# Patient Record
Sex: FEMALE | Race: WHITE | URBAN - METROPOLITAN AREA
[De-identification: names, ages, dates, MRNs, and addresses within clinical notes are randomized per-mention and may not be internally consistent; named-entity substitution may affect disease eponyms.]

---

## 2020-02-20 NOTE — PATIENT DISCUSSION
Medical Necessity Information: It is in your best interest to select a reason for this procedure from the list below. All of these items fulfill various CMS LCD requirements except the new and changing color options. Plan OS 1st ; TMF+3.25/CV/B+/B Goal LANDY + OCUCOAT. + SDPO. Render Post-Care Instructions In Note?: no Number Of Freeze-Thaw Cycles: 1 freeze-thaw cycle Detail Level: Zone Medical Necessity Clause: This procedure was medically necessary because the lesions that were treated were: Post-Care Instructions: I reviewed with the patient in detail post-care instructions. Patient is to wear sunprotection, and avoid picking at any of the treated lesions. Pt may apply Vaseline to crusted or scabbing areas. Consent: The patient's consent was obtained including but not limited to risks of crusting, scabbing, blistering, scarring, darker or lighter pigmentary change, recurrence, incomplete removal and infection.

## 2021-05-20 ENCOUNTER — IMPORTED ENCOUNTER (OUTPATIENT)
Dept: URBAN - METROPOLITAN AREA CLINIC 1 | Facility: CLINIC | Age: 86
End: 2021-05-20

## 2021-05-20 PROBLEM — R73.09: Noted: 2021-05-20

## 2021-05-20 PROBLEM — H25.813: Noted: 2021-05-20

## 2021-05-20 PROBLEM — H04.123: Noted: 2021-05-20

## 2021-05-20 PROCEDURE — 99204 OFFICE O/P NEW MOD 45 MIN: CPT

## 2021-05-20 NOTE — PATIENT DISCUSSION
1.  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 Stressed the importance of strong glucose control. Advised of importance of at least yearly dilated examinations but to contact us immediately for any problems or concerns. 2. Cataract OU --  Visually Significant OU discussed the risks benefits alternatives and limitations of cataract surgery. The patient stated a full understanding and a desire to proceed with the procedure. The patient will need to return for preop appointment with cataract measurements and to have any additional questions answered and start pre-operative eye drops as directed. Phaco PCL OD then OS 3. Dry Eyes OU -- Recommend the use of ATs BID OU routinely. All conditions discussed today with patinet and patients daughter. Letter to PCP. Return for an appointment in [facility] with Dr. Jaya Mata.

## 2021-08-27 ENCOUNTER — IMPORTED ENCOUNTER (OUTPATIENT)
Dept: URBAN - METROPOLITAN AREA CLINIC 1 | Facility: CLINIC | Age: 86
End: 2021-08-27

## 2021-08-27 PROBLEM — R73.09: Noted: 2021-08-27

## 2021-08-27 PROBLEM — Z79.4: Noted: 2021-08-27

## 2021-08-27 PROBLEM — H25.813: Noted: 2021-08-27

## 2021-08-27 PROBLEM — E11.9: Noted: 2021-08-27

## 2021-08-27 PROCEDURE — 92136 OPHTHALMIC BIOMETRY: CPT

## 2021-08-27 PROCEDURE — 92015 DETERMINE REFRACTIVE STATE: CPT

## 2021-08-27 NOTE — PATIENT DISCUSSION
1.  Cataract OU --  Visually Significant discussed the risks benefits alternatives and limitations of cataract surgery. The patient stated a full understanding and a desire to proceed with the procedure. Discussed with patient if PO Gtts are more than $120 for all three combined when filling at their Pharmacy please call our office to request generic substitutions. Pt came to pre-op appointment today with daughter (POA). Pt understands they will need glasses post-op to achieve their best corrected vision. and Lifestyle Questionnaire Completed. Phaco PCL OD then OSPhaco PCL OD 2. 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 Stressed the importance of strong glucose control. Advised of importance of at least yearly dilated examinations but to contact us immediately for any problems or concerns. 3. Dry Eyes OU -- Recommend the use of ATs BID OU routinely. All conditions discussed today with carlosnet and patients daughter.

## 2021-09-08 ENCOUNTER — IMPORTED ENCOUNTER (OUTPATIENT)
Dept: URBAN - METROPOLITAN AREA CLINIC 1 | Facility: CLINIC | Age: 86
End: 2021-09-08

## 2021-09-09 ENCOUNTER — IMPORTED ENCOUNTER (OUTPATIENT)
Dept: URBAN - METROPOLITAN AREA CLINIC 1 | Facility: CLINIC | Age: 86
End: 2021-09-09

## 2021-09-09 PROBLEM — Z96.1: Noted: 2021-09-09

## 2021-09-09 PROCEDURE — 99024 POSTOP FOLLOW-UP VISIT: CPT

## 2021-09-09 NOTE — PATIENT DISCUSSION
POD#1 CE/IOL OD (Standard) doing well. Corneal Edema noted on today's exam 1 gtt Combigan instilled prior to leavingUse Zylet BID OD Prolensa Qdaily OD : Use all gtts through completion of PO gtt chart regimen/ Per our instructions given to patient.   Post op Warnings Reiterated RTC as scheduled

## 2021-09-24 ENCOUNTER — IMPORTED ENCOUNTER (OUTPATIENT)
Dept: URBAN - METROPOLITAN AREA CLINIC 1 | Facility: CLINIC | Age: 86
End: 2021-09-24

## 2021-09-24 PROBLEM — H25.812: Noted: 2021-09-24

## 2021-09-24 PROCEDURE — 92136 OPHTHALMIC BIOMETRY: CPT

## 2021-09-24 NOTE — PATIENT DISCUSSION
1.  Cataract OS Visually Significant secondary to glare discussed the risks benefits alternatives and limitations of cataract surgery. The patient stated a full understanding and a desire to proceed with the procedure. Discussed with patient if PO Gtts are more than $120 for all three combined when filling at their Pharmacy please call our office to request generic substitutions. Pt understands they will need glasses post-op to achieve their best corrected vision. Phaco PCL OS 2. POW#3  CE/IOL OD (Standard) doing well. Use Zylet BID OD & Prolensa Qdaily OD: Use through completion of po gtt regimen.  F/u as scheduled 2nd eye

## 2021-09-29 ENCOUNTER — IMPORTED ENCOUNTER (OUTPATIENT)
Dept: URBAN - METROPOLITAN AREA CLINIC 1 | Facility: CLINIC | Age: 86
End: 2021-09-29

## 2021-09-30 ENCOUNTER — IMPORTED ENCOUNTER (OUTPATIENT)
Dept: URBAN - METROPOLITAN AREA CLINIC 1 | Facility: CLINIC | Age: 86
End: 2021-09-30

## 2021-09-30 PROBLEM — Z96.1: Noted: 2021-09-30

## 2021-09-30 PROCEDURE — 99024 POSTOP FOLLOW-UP VISIT: CPT

## 2021-09-30 NOTE — PATIENT DISCUSSION
1. POD#1 Phaco/ PCL OS (Standard) - doing well. Use Zylet BID OS Prolensa Qdaily OS: Use all gtts through completion of PO gtt chart regimen/ Per our instructions given. Post op Warnings Reiterated 2. POW#3 Phaco/ PCL OD (Standard)- doing well  Use Zylet BID OD & Prolensa Qdaily OD: Use through completion of po gtt regimen.   RTC as scheduled

## 2021-10-15 ENCOUNTER — IMPORTED ENCOUNTER (OUTPATIENT)
Dept: URBAN - METROPOLITAN AREA CLINIC 1 | Facility: CLINIC | Age: 86
End: 2021-10-15

## 2021-10-15 PROBLEM — Z09: Noted: 2021-10-15

## 2021-10-15 PROBLEM — Z96.1: Noted: 2021-10-15

## 2021-10-15 PROCEDURE — 99024 POSTOP FOLLOW-UP VISIT: CPT

## 2021-10-15 NOTE — PATIENT DISCUSSION
POM#1 CE/IOL OU doing well. Continue Lotemax/Durezol/Prednisolone BID until gone. Continue Prolensa/Ilevro/Acular QD until gone.

## 2021-10-15 NOTE — PATIENT DISCUSSION
POW#3 Phaco/ PCL OU (Standard) Finish PO meds per PO gtt schedule Hold on MRx Return for an appointment in 6 months 27 (MRX) with Dr. Aaliyah Hill.

## 2022-04-02 ASSESSMENT — VISUAL ACUITY
OS_GLARE: 20/100
OD_CC: J10
OS_CC: 20/100
OD_SC: 20/80
OD_CC: 20/150
OS_CC: 20/150
OD_CC: 20/100
OS_GLARE: 20/100
OS_CC: 20/100
OS_CC: 20/100
OS_CC: J10
OS_CC: 20/50
OS_SC: 20/100
OD_CC: 20/100
OD_CC: 20/40-2
OS_PH: CC 20/70
OD_CC: 20/100
OD_CC: 20/50

## 2022-04-02 ASSESSMENT — TONOMETRY
OD_IOP_MMHG: 21
OD_IOP_MMHG: 21
OS_IOP_MMHG: 22
OD_IOP_MMHG: 23
OS_IOP_MMHG: 20
OS_IOP_MMHG: 22
OD_IOP_MMHG: 21
OS_IOP_MMHG: 21
OD_IOP_MMHG: 20
OD_IOP_MMHG: 21
OS_IOP_MMHG: 26

## 2022-04-03 PROBLEM — N39.0 UTI (URINARY TRACT INFECTION): Status: ACTIVE | Noted: 2022-04-03

## 2022-04-03 PROBLEM — K59.00 CONSTIPATION: Status: ACTIVE | Noted: 2022-04-03

## 2022-04-03 PROBLEM — N32.0 BLADDER OUTLET OBSTRUCTION: Status: ACTIVE | Noted: 2022-04-03

## 2022-05-08 PROBLEM — N39.0 UTI (URINARY TRACT INFECTION): Status: RESOLVED | Noted: 2022-04-03 | Resolved: 2022-05-08

## 2023-02-01 RX ORDER — FAMOTIDINE 20 MG/1
20 TABLET, FILM COATED ORAL DAILY
COMMUNITY
Start: 2022-04-08

## 2023-02-01 RX ORDER — AMLODIPINE BESYLATE 2.5 MG/1
2.5 TABLET ORAL DAILY
COMMUNITY

## 2023-02-01 RX ORDER — ALBUTEROL SULFATE 90 UG/1
2 AEROSOL, METERED RESPIRATORY (INHALATION)
COMMUNITY